# Patient Record
Sex: MALE | Race: WHITE | Employment: FULL TIME | URBAN - METROPOLITAN AREA
[De-identification: names, ages, dates, MRNs, and addresses within clinical notes are randomized per-mention and may not be internally consistent; named-entity substitution may affect disease eponyms.]

---

## 2017-04-12 ENCOUNTER — APPOINTMENT (OUTPATIENT)
Dept: GENERAL RADIOLOGY | Age: 27
End: 2017-04-12
Attending: PHYSICIAN ASSISTANT
Payer: SELF-PAY

## 2017-04-12 ENCOUNTER — HOSPITAL ENCOUNTER (EMERGENCY)
Age: 27
Discharge: HOME | End: 2017-04-12
Attending: EMERGENCY MEDICINE
Payer: SELF-PAY

## 2017-04-12 VITALS
TEMPERATURE: 98 F | DIASTOLIC BLOOD PRESSURE: 61 MMHG | OXYGEN SATURATION: 99 % | HEIGHT: 68 IN | RESPIRATION RATE: 18 BRPM | HEART RATE: 84 BPM | WEIGHT: 170 LBS | BODY MASS INDEX: 25.76 KG/M2 | SYSTOLIC BLOOD PRESSURE: 106 MMHG

## 2017-04-12 DIAGNOSIS — J11.1 INFLUENZA-LIKE ILLNESS: Primary | ICD-10-CM

## 2017-04-12 LAB
ATRIAL RATE: 83 BPM
CALCULATED P AXIS, ECG09: 63 DEGREES
CALCULATED R AXIS, ECG10: 77 DEGREES
CALCULATED T AXIS, ECG11: 30 DEGREES
DIAGNOSIS, 93000: NORMAL
P-R INTERVAL, ECG05: 132 MS
Q-T INTERVAL, ECG07: 336 MS
QRS DURATION, ECG06: 90 MS
QTC CALCULATION (BEZET), ECG08: 394 MS
VENTRICULAR RATE, ECG03: 83 BPM

## 2017-04-12 PROCEDURE — 99284 EMERGENCY DEPT VISIT MOD MDM: CPT

## 2017-04-12 PROCEDURE — 74011250637 HC RX REV CODE- 250/637: Performed by: PHYSICIAN ASSISTANT

## 2017-04-12 PROCEDURE — 71020 XR CHEST PA LAT: CPT

## 2017-04-12 PROCEDURE — 93005 ELECTROCARDIOGRAM TRACING: CPT

## 2017-04-12 RX ORDER — CODEINE PHOSPHATE AND GUAIFENESIN 10; 100 MG/5ML; MG/5ML
5 SOLUTION ORAL
Qty: 120 ML | Refills: 0 | Status: SHIPPED | OUTPATIENT
Start: 2017-04-12

## 2017-04-12 RX ADMIN — Medication 30 ML: at 11:39

## 2017-04-12 NOTE — ED PROVIDER NOTES
[address]  EMERGENCY DEPARTMENT HISTORY AND PHYSICAL EXAM       Date: 4/12/2017   Patient Name: Diane Bains   YOB: 1990  Medical Record Number: [id number]    History of Presenting Illness     Chief Complaint   Patient presents with    Fever    Flu    Chest Pain        History Provided By:  patient    Additional History:   10:47 AM  Diane Bains is a 27 y.o. male who presents to the emergency department C/O flu sx beginning with fever, chills, and body aches onset 2 days ago. Sx have worsened with CP, wheezing, decreased appetite, sore throat, and cough. Pt has been drinking ginger ale, which has helped to relieve CP. Pt has had ill contacts. Denies tobacco use, EtOH use, and illicit drug use. Denies congestion, N/V/D, and any other sx or complaints. Primary Care Provider: None   Specialist:    Past History     Past Medical History:   History reviewed. No pertinent past medical history. Past Surgical History:   History reviewed. No pertinent surgical history. Family History:   History reviewed. No pertinent family history. Social History:   Social History   Substance Use Topics    Smoking status: Never Smoker    Smokeless tobacco: None    Alcohol use No        Allergies:   No Known Allergies     Review of Systems   Review of Systems   Constitutional: Positive for appetite change (decreased), chills and fever. HENT: Positive for sore throat. Negative for congestion. Respiratory: Positive for cough and wheezing. Cardiovascular: Positive for chest pain. Gastrointestinal: Negative for diarrhea, nausea and vomiting. All other systems reviewed and are negative. Physical Exam  Vitals:    04/12/17 1139 04/12/17 1140 04/12/17 1145 04/12/17 1200   BP: 126/69  131/68 106/61   Pulse: 79 93 86 84   Resp: 17 19 15 18   Temp:       SpO2:  99% 99% 99%   Weight:       Height:           Physical Exam   Nursing note and vitals reviewed.   Vital signs and nursing notes reviewed. CONSTITUTIONAL: Alert. Nontoxic-appearing; well-nourished; in no apparent distress. HEAD: Normocephalic; atraumatic. EYES: PERRL; EOM's intact. No nystagmus. Conjunctiva clear. ENT: TM's normal. External ear normal. Normal nose; no rhinorrhea. Normal pharynx. Tonsils not enlarged without exudate. Moist mucus membranes. NECK: Supple; FROM without difficulty, non-tender; no cervical lymphadenopathy. No rigidity. CV: Normal S1, S2; no murmurs, rubs, or gallops. Mild anterior chest wall tenderness. RESPIRATORY: Occasional cough. Normal chest excursion with respiration; breath sounds clear and equal bilaterally; no wheezes, rhonchi, or rales. GI: Normal bowel sounds; non-distended; non-tender; no guarding or rigidity; no palpable organomegaly. No CVA tenderness. EXT: Normal ROM in all four extremities; non-tender to palpation. SKIN: Normal for age and race; warm; dry; good turgor; no apparent lesions or exudate. NEURO: A & O x3. PSYCH:  Mood and affect appropriate. Diagnostic Study Results     Labs -      No results found for this or any previous visit (from the past 12 hour(s)). Radiologic Studies -  The following have been ordered and reviewed:    X-RAY FINDINGS:  11:56 AM  Chest x-ray shows NAP  Pending review by Radiologist  Recorded by ALICIA Villanuevaibzechariah, as dictated by Kilo Crockett PA-C    XR CHEST PA LAT   Final Result          Medical Decision Making   I am the first provider for this patient. I reviewed the vital signs, available nursing notes, past medical history, past surgical history, family history and social history. Vital Signs-Reviewed the patient's vital signs. No data found. Pulse Oximetry Analysis - Normal 100% on room air     EKG interpretation: (Preliminary)  Rhythm: normal sinus rhythm with sinus arrhythmia .  Rate (approx.): 83; Axis: normal; P wave: normal; QRS interval: normal ; ST/T wave: normal.   Read by Ivis Mcpherson PA-C at 10:38 AM    Old Medical Records: Nursing notes. Procedures:   Procedures    ED Course:  10:47 AM  Initial assessment performed. The patients presenting problems have been discussed, and they are in agreement with the care plan formulated and outlined with them. I have encouraged them to ask questions as they arise throughout their visit. Medications Given in the ED:  Medications   GI COCKTAIL [facility] CMPD) (30 mL Oral Given 4/12/17 1139)       Discharge Note:  11:57 AM  Pt has been reexamined. Patient has no new complaints, changes, or physical findings. Care plan outlined and precautions discussed. Results were reviewed with the patient. All medications were reviewed with the patient; will d/c home with cheratussin. All of pt's questions and concerns were addressed. Patient was instructed and agrees to follow up with [facility], as well as to return to the ED upon further deterioration. Patient is ready to go home. Diagnosis   Clinical Impression:   1. Influenza-like illness         Follow-up Information     Follow up With Details Comments Contact Info    [facility] Go to As needed for PCP follow up [id number] [facility], [address] [place] [address] [address] [place] [address] [address]    [facility] EM[facility]  As needed, If symptoms worsen [address] [place] [id number]  [phone]          Discharge Medication List as of 4/12/2017 11:57 AM      START taking these medications    Details   guaiFENesin-codeine (CHERATUSSIN AC) 100-10 mg/5 mL solution Take 5 mL by mouth three (3) times daily as needed for Cough. Max Daily Amount: 15 mL. , Print, Disp-120 mL, R-0             _______________________________   Attestations: This note is prepared by Nii Page, acting as a Scribe for Ivis Mcpherson PA-C on 10:41 AM on 4/12/2017.     Ivis Mcpherson PA-C: The scribe's documentation has been prepared under my direction and personally reviewed by me in its entirety.   _______________________________

## 2017-04-12 NOTE — DISCHARGE INSTRUCTIONS
Influenza (Flu): Care Instructions  Your Care Instructions  Influenza (flu) is an infection in the lungs and breathing passages. It is caused by the influenza virus. There are different strains, or types, of the flu virus from year to year. Unlike the common cold, the flu comes on suddenly and the symptoms, such as a cough, congestion, fever, chills, fatigue, aches, and pains, are more severe. These symptoms may last up to 10 days. Although the flu can make you feel very sick, it usually doesn't cause serious health problems. Home treatment is usually all you need for flu symptoms. But your doctor may prescribe antiviral medicine to prevent other health problems, such as pneumonia, from developing. Older people and those who have a long-term health condition, such as lung disease, are most at risk for having pneumonia or other health problems. Follow-up care is a key part of your treatment and safety. Be sure to make and go to all appointments, and call your doctor if you are having problems. Its also a good idea to know your test results and keep a list of the medicines you take. How can you care for yourself at home? · Get plenty of rest.  · Drink plenty of fluids, enough so that your urine is light yellow or clear like water. If you have kidney, heart, or liver disease and have to limit fluids, talk with your doctor before you increase the amount of fluids you drink. · Take an over-the-counter pain medicine if needed, such as acetaminophen (Tylenol), ibuprofen (Advil, Motrin), or naproxen (Aleve), to relieve fever, headache, and muscle aches. Read and follow all instructions on the label. No one younger than 20 should take aspirin. It has been linked to Reye syndrome, a serious illness. · Do not smoke. Smoking can make the flu worse. If you need help quitting, talk to your doctor about stop-smoking programs and medicines. These can increase your chances of quitting for good.   · Breathe moist air from a hot shower or from a sink filled with hot water to help clear a stuffy nose. · Before you use cough and cold medicines, check the label. These medicines may not be safe for young children or for people with certain health problems. · If the skin around your nose and lips becomes sore, put some petroleum jelly on the area. · To ease coughing:  ¨ Drink fluids to soothe a scratchy throat. ¨ Suck on cough drops or plain hard candy. ¨ Take an over-the-counter cough medicine that contains dextromethorphan to help you get some sleep. Read and follow all instructions on the label. ¨ Raise your head at night with an extra pillow. This may help you rest if coughing keeps you awake. · Take any prescribed medicine exactly as directed. Call your doctor if you think you are having a problem with your medicine. To avoid spreading the flu  · Wash your hands regularly, and keep your hands away from your face. · Stay home from school, work, and other public places until you are feeling better and your fever has been gone for at least 24 hours. The fever needs to have gone away on its own without the help of medicine. · Ask people living with you to talk to their doctors about preventing the flu. They may get antiviral medicine to keep from getting the flu from you. · To prevent the flu in the future, get a flu vaccine every fall. Encourage people living with you to get the vaccine. · Cover your mouth when you cough or sneeze. When should you call for help? Call 911 anytime you think you may need emergency care. For example, call if:  · You have severe trouble breathing. Call your doctor now or seek immediate medical care if:  · You have new or worse trouble breathing. · You seem to be getting much sicker. · You feel very sleepy or confused. · You have a new or higher fever. · You get a new rash.   Watch closely for changes in your health, and be sure to contact your doctor if:  · You begin to get better and then get worse. · You are not getting better after 1 week. Where can you learn more? Go to http://annamaria-dakota.info/. Enter M883 in the search box to learn more about \"Influenza (Flu): Care Instructions. \"  Current as of: May 23, 2016  Content Version: 11.2  © 9409-4177 [employer]. Care instructions adapted under license by [employer] (which disclaims liability or warranty for this information). If you have questions about a medical condition or this instruction, always ask your healthcare professional. [address] any warranty or liability for your use of this information.

## 2017-04-12 NOTE — ED TRIAGE NOTES
Patient reports he has chest pain, fever and flu. Sepsis Screening completed    (  )Patient meets SIRS criteria. (x  )Patient does not meet SIRS criteria.       SIRS Criteria is achieved when two or more of the following are present   Temperature < 96.8°F (36°C) or > 100.9°F (38.3°C)   Heart Rate > 90 beats per minute   Respiratory Rate > 20 breaths per minute   WBC count > 12,000 or <4,000 or > 10% bands

## 2017-04-12 NOTE — LETTER
[facility] [place] 
[facility] EM[facility]GENC[facility] 
[address] [address] 
[phone] Work/School Note Date: 4/12/2017 To Whom It May concern: 
 
Clint Rivera was seen and treated today in the emergency room by the following provider(s): 
Attending Provider: Ruiz Woody MD 
Physician Assistant: Elan Schwarz. Clint Rivera may return to work on Friday, 4/14/17.  
 
Sincerely, 
 
 
 
 
Luh Farmer PA-C